# Patient Record
Sex: FEMALE | Race: BLACK OR AFRICAN AMERICAN | NOT HISPANIC OR LATINO | Employment: STUDENT | URBAN - METROPOLITAN AREA
[De-identification: names, ages, dates, MRNs, and addresses within clinical notes are randomized per-mention and may not be internally consistent; named-entity substitution may affect disease eponyms.]

---
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
## 2019-03-21 ENCOUNTER — HOSPITAL ENCOUNTER (EMERGENCY)
Facility: HOSPITAL | Age: 19
Discharge: HOME | End: 2019-03-21
Attending: EMERGENCY MEDICINE
Payer: COMMERCIAL

## 2019-03-21 VITALS
DIASTOLIC BLOOD PRESSURE: 66 MMHG | WEIGHT: 122 LBS | OXYGEN SATURATION: 99 % | TEMPERATURE: 99 F | SYSTOLIC BLOOD PRESSURE: 92 MMHG | HEIGHT: 63 IN | BODY MASS INDEX: 21.62 KG/M2 | HEART RATE: 80 BPM | RESPIRATION RATE: 16 BRPM
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
## 2019-03-21 DIAGNOSIS — S39.012A ACUTE MYOFASCIAL STRAIN OF LUMBAR REGION, INITIAL ENCOUNTER: ICD-10-CM

## 2019-03-21 DIAGNOSIS — S16.1XXA CERVICAL MYOFASCIAL STRAIN, INITIAL ENCOUNTER: ICD-10-CM

## 2019-03-21 LAB
B-HCG UR QL: NEGATIVE
CTP QC/QA: YES

## 2019-03-21 PROCEDURE — 96372 THER/PROPH/DIAG INJ SC/IM: CPT

## 2019-03-21 PROCEDURE — 81025 URINE PREGNANCY TEST: CPT | Performed by: NURSE PRACTITIONER

## 2019-03-21 PROCEDURE — 99284 EMERGENCY DEPT VISIT MOD MDM: CPT | Mod: 25

## 2019-03-21 PROCEDURE — 63600175 PHARM REV CODE 636 W HCPCS: Performed by: NURSE PRACTITIONER

## 2019-03-21 RX ORDER — KETOROLAC TROMETHAMINE 30 MG/ML
30 INJECTION, SOLUTION INTRAMUSCULAR; INTRAVENOUS
Status: COMPLETED | OUTPATIENT
Start: 2019-03-21 | End: 2019-03-21

## 2019-03-21 RX ORDER — TIZANIDINE 2 MG/1
2 TABLET ORAL EVERY 8 HOURS PRN
Qty: 10 TABLET | Refills: 0 | Status: SHIPPED | OUTPATIENT
Start: 2019-03-21

## 2019-03-21 RX ORDER — NAPROXEN 500 MG/1
500 TABLET ORAL EVERY 12 HOURS PRN
Qty: 20 TABLET | Refills: 0 | Status: SHIPPED | OUTPATIENT
Start: 2019-03-21

## 2019-03-21 RX ADMIN — KETOROLAC TROMETHAMINE 30 MG: 30 INJECTION INTRAMUSCULAR; INTRAVENOUS at 07:03
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
## 2019-03-22 NOTE — DISCHARGE INSTRUCTIONS

## 2021-07-01 ENCOUNTER — PATIENT MESSAGE (OUTPATIENT)
Dept: ADMINISTRATIVE | Facility: OTHER | Age: 21
End: 2021-07-01

## 2024-05-08 ENCOUNTER — HOSPITAL ENCOUNTER (EMERGENCY)
Facility: HOSPITAL | Age: 24
Discharge: HOME | End: 2024-05-08
Attending: EMERGENCY MEDICINE

## 2024-05-08 VITALS
HEIGHT: 63 IN | WEIGHT: 162 LBS | DIASTOLIC BLOOD PRESSURE: 77 MMHG | HEART RATE: 61 BPM | RESPIRATION RATE: 17 BRPM | TEMPERATURE: 98 F | OXYGEN SATURATION: 99 % | BODY MASS INDEX: 28.7 KG/M2 | SYSTOLIC BLOOD PRESSURE: 108 MMHG

## 2024-05-08 DIAGNOSIS — N89.8 VAGINAL DISCHARGE: Primary | ICD-10-CM

## 2024-05-08 LAB
ALBUMIN SERPL BCP-MCNC: 4.5 G/DL (ref 3.5–5.2)
ALP SERPL-CCNC: 49 U/L (ref 55–135)
ALT SERPL W/O P-5'-P-CCNC: 11 U/L (ref 10–44)
ANION GAP SERPL CALC-SCNC: 9 MMOL/L (ref 8–16)
AST SERPL-CCNC: 14 U/L (ref 10–40)
B-HCG UR QL: NEGATIVE
BASOPHILS # BLD AUTO: 0.03 K/UL (ref 0–0.2)
BASOPHILS NFR BLD: 0.4 % (ref 0–1.9)
BILIRUB SERPL-MCNC: 1.1 MG/DL (ref 0.1–1)
BILIRUB UR QL STRIP: NEGATIVE
BUN SERPL-MCNC: 8 MG/DL (ref 6–20)
CALCIUM SERPL-MCNC: 9 MG/DL (ref 8.7–10.5)
CHLORIDE SERPL-SCNC: 108 MMOL/L (ref 95–110)
CLARITY UR: CLEAR
CO2 SERPL-SCNC: 24 MMOL/L (ref 23–29)
COLOR UR: YELLOW
CREAT SERPL-MCNC: 0.8 MG/DL (ref 0.5–1.4)
CTP QC/QA: YES
DIFFERENTIAL METHOD BLD: ABNORMAL
EOSINOPHIL # BLD AUTO: 0.4 K/UL (ref 0–0.5)
EOSINOPHIL NFR BLD: 5.2 % (ref 0–8)
ERYTHROCYTE [DISTWIDTH] IN BLOOD BY AUTOMATED COUNT: 17.6 % (ref 11.5–14.5)
EST. GFR  (NO RACE VARIABLE): >60 ML/MIN/1.73 M^2
GLUCOSE SERPL-MCNC: 88 MG/DL (ref 70–110)
GLUCOSE UR QL STRIP: NEGATIVE
HCT VFR BLD AUTO: 32.2 % (ref 37–48.5)
HGB BLD-MCNC: 10 G/DL (ref 12–16)
HGB UR QL STRIP: ABNORMAL
IMM GRANULOCYTES # BLD AUTO: 0 K/UL (ref 0–0.04)
IMM GRANULOCYTES NFR BLD AUTO: 0 % (ref 0–0.5)
KETONES UR QL STRIP: NEGATIVE
LEUKOCYTE ESTERASE UR QL STRIP: ABNORMAL
LIPASE SERPL-CCNC: 27 U/L (ref 4–60)
LYMPHOCYTES # BLD AUTO: 2.3 K/UL (ref 1–4.8)
LYMPHOCYTES NFR BLD: 33.2 % (ref 18–48)
MAGNESIUM SERPL-MCNC: 1.8 MG/DL (ref 1.6–2.6)
MCH RBC QN AUTO: 23.6 PG (ref 27–31)
MCHC RBC AUTO-ENTMCNC: 31.1 G/DL (ref 32–36)
MCV RBC AUTO: 76 FL (ref 82–98)
MICROSCOPIC COMMENT: ABNORMAL
MONOCYTES # BLD AUTO: 0.5 K/UL (ref 0.3–1)
MONOCYTES NFR BLD: 7.1 % (ref 4–15)
NEUTROPHILS # BLD AUTO: 3.8 K/UL (ref 1.8–7.7)
NEUTROPHILS NFR BLD: 54.1 % (ref 38–73)
NITRITE UR QL STRIP: NEGATIVE
NRBC BLD-RTO: 0 /100 WBC
PH UR STRIP: 7 [PH] (ref 5–8)
PLATELET # BLD AUTO: 251 K/UL (ref 150–450)
PMV BLD AUTO: 9.5 FL (ref 9.2–12.9)
POTASSIUM SERPL-SCNC: 3.3 MMOL/L (ref 3.5–5.1)
PROT SERPL-MCNC: 7.3 G/DL (ref 6–8.4)
PROT UR QL STRIP: ABNORMAL
RBC # BLD AUTO: 4.24 M/UL (ref 4–5.4)
RBC #/AREA URNS HPF: 1 /HPF (ref 0–4)
SODIUM SERPL-SCNC: 141 MMOL/L (ref 136–145)
SP GR UR STRIP: 1.02 (ref 1–1.03)
SQUAMOUS #/AREA URNS HPF: 3 /HPF
URN SPEC COLLECT METH UR: ABNORMAL
UROBILINOGEN UR STRIP-ACNC: ABNORMAL EU/DL
WBC # BLD AUTO: 6.93 K/UL (ref 3.9–12.7)
WBC #/AREA URNS HPF: 7 /HPF (ref 0–5)

## 2024-05-08 PROCEDURE — 81025 URINE PREGNANCY TEST: CPT | Performed by: EMERGENCY MEDICINE

## 2024-05-08 PROCEDURE — 83690 ASSAY OF LIPASE: CPT | Performed by: EMERGENCY MEDICINE

## 2024-05-08 PROCEDURE — 25000003 PHARM REV CODE 250: Performed by: STUDENT IN AN ORGANIZED HEALTH CARE EDUCATION/TRAINING PROGRAM

## 2024-05-08 PROCEDURE — 83735 ASSAY OF MAGNESIUM: CPT | Performed by: EMERGENCY MEDICINE

## 2024-05-08 PROCEDURE — 81001 URINALYSIS AUTO W/SCOPE: CPT | Performed by: EMERGENCY MEDICINE

## 2024-05-08 PROCEDURE — 96374 THER/PROPH/DIAG INJ IV PUSH: CPT

## 2024-05-08 PROCEDURE — 63600175 PHARM REV CODE 636 W HCPCS: Performed by: STUDENT IN AN ORGANIZED HEALTH CARE EDUCATION/TRAINING PROGRAM

## 2024-05-08 PROCEDURE — 80053 COMPREHEN METABOLIC PANEL: CPT | Performed by: EMERGENCY MEDICINE

## 2024-05-08 PROCEDURE — 85025 COMPLETE CBC W/AUTO DIFF WBC: CPT | Performed by: EMERGENCY MEDICINE

## 2024-05-08 PROCEDURE — 99281 EMR DPT VST MAYX REQ PHY/QHP: CPT | Mod: 25

## 2024-05-08 RX ORDER — CEFTRIAXONE 1 G/1
1 INJECTION, POWDER, FOR SOLUTION INTRAMUSCULAR; INTRAVENOUS ONCE
Status: COMPLETED | OUTPATIENT
Start: 2024-05-08 | End: 2024-05-08

## 2024-05-08 RX ORDER — LIDOCAINE HYDROCHLORIDE 10 MG/ML
1 INJECTION, SOLUTION EPIDURAL; INFILTRATION; INTRACAUDAL; PERINEURAL
Status: DISCONTINUED | OUTPATIENT
Start: 2024-05-08 | End: 2024-05-08 | Stop reason: CLARIF

## 2024-05-08 RX ORDER — METRONIDAZOLE 500 MG/1
500 TABLET ORAL EVERY 12 HOURS
Qty: 28 TABLET | Refills: 0 | Status: SHIPPED | OUTPATIENT
Start: 2024-05-09 | End: 2024-05-23

## 2024-05-08 RX ORDER — DOXYCYCLINE 100 MG/1
100 CAPSULE ORAL 2 TIMES DAILY
Qty: 28 CAPSULE | Refills: 0 | Status: SHIPPED | OUTPATIENT
Start: 2024-05-09 | End: 2024-05-08

## 2024-05-08 RX ORDER — METRONIDAZOLE 250 MG/1
500 TABLET ORAL
Status: COMPLETED | OUTPATIENT
Start: 2024-05-08 | End: 2024-05-08

## 2024-05-08 RX ORDER — CEFTRIAXONE 1 G/1
1 INJECTION, POWDER, FOR SOLUTION INTRAMUSCULAR; INTRAVENOUS ONCE
Status: DISCONTINUED | OUTPATIENT
Start: 2024-05-08 | End: 2024-05-08

## 2024-05-08 RX ORDER — METRONIDAZOLE 500 MG/1
500 TABLET ORAL EVERY 12 HOURS
Qty: 28 TABLET | Refills: 0 | Status: SHIPPED | OUTPATIENT
Start: 2024-05-09 | End: 2024-05-08

## 2024-05-08 RX ORDER — DOXYCYCLINE 100 MG/1
100 CAPSULE ORAL ONCE
Status: COMPLETED | OUTPATIENT
Start: 2024-05-08 | End: 2024-05-08

## 2024-05-08 RX ORDER — DOXYCYCLINE 100 MG/1
100 CAPSULE ORAL 2 TIMES DAILY
Qty: 28 CAPSULE | Refills: 0 | Status: SHIPPED | OUTPATIENT
Start: 2024-05-09 | End: 2024-05-23

## 2024-05-08 RX ADMIN — DOXYCYCLINE HYCLATE 100 MG: 100 CAPSULE ORAL at 09:05

## 2024-05-08 RX ADMIN — CEFTRIAXONE SODIUM 1 G: 1 INJECTION, POWDER, FOR SOLUTION INTRAMUSCULAR; INTRAVENOUS at 09:05

## 2024-05-08 RX ADMIN — METRONIDAZOLE 500 MG: 250 TABLET ORAL at 09:05

## 2024-05-08 NOTE — ED PROVIDER NOTES
Encounter Date: 5/8/2024       History     Chief Complaint   Patient presents with    Abdominal Pain     Lower abdomen pain x1 week that radiates to the back. Pt also complains of foul discharge from her vagina.     Vaginal Discharge     HPI  23 year old female presenting for evaluation of 2 weeks constant low left pelvic pain and several days of foul-smelling vaginal discharge. + dysuria. Denies fever, chills, sore throat, decreased urination, chest pain, cough, rash. No new medications nor recent antibiotic use. 1 new partner in the past month; no barrier use. Patient does not use tampons.  Review of patient's allergies indicates:  No Known Allergies  Past Medical History:   Diagnosis Date    History of dilation and curettage      Past Surgical History:   Procedure Laterality Date    DILATION AND CURETTAGE OF UTERUS       Family History   Problem Relation Name Age of Onset    Hypertension Mother      Cancer Maternal Grandfather      Heart disease Maternal Grandfather      Diabetes Maternal Grandmother      Breast cancer Neg Hx      Colon cancer Neg Hx      Ovarian cancer Neg Hx       Social History     Tobacco Use    Smoking status: Every Day    Smokeless tobacco: Never    Tobacco comments:     3 CIGARETTES A DAY   Substance Use Topics    Alcohol use: No    Drug use: Yes     Types: Marijuana     Comment: EVERY OTHER DAY     Review of Systems   Constitutional:  Negative for chills, diaphoresis and fever.   HENT:  Negative for sore throat.    Respiratory:  Negative for shortness of breath.    Gastrointestinal:  Negative for diarrhea, nausea and vomiting.   Genitourinary:  Positive for dysuria, pelvic pain and vaginal discharge.   Skin:  Negative for rash and wound.   Neurological:  Negative for light-headedness and headaches.       Physical Exam     Initial Vitals [05/08/24 1650]   BP Pulse Resp Temp SpO2   108/77 61 17 98.1 °F (36.7 °C) 99 %      MAP       --         Physical Exam    Nursing note and vitals  reviewed.  Constitutional: She appears well-developed and well-nourished. She is not diaphoretic. She is cooperative.  Non-toxic appearance. She does not have a sickly appearance. She does not appear ill. No distress. She is not intubated.   HENT:   Head: Normocephalic and atraumatic.   Nose: Nose normal.   Eyes: Conjunctivae and EOM are normal.   Neck:   Normal range of motion.  Cardiovascular:  Normal rate and regular rhythm.           Pulmonary/Chest: Effort normal and breath sounds normal. No accessory muscle usage. No tachypnea. She is not intubated. No respiratory distress. She has no wheezes. She has no rhonchi.   Abdominal: Abdomen is soft and flat. She exhibits no distension. There is abdominal tenderness.   No right CVA tenderness.  No left CVA tenderness.     There is no rebound, no guarding and negative Morgan's sign.   Genitourinary:    Genitourinary Comments: Patient declined pelvic examination.     Musculoskeletal:         General: No tenderness or edema. Normal range of motion.      Cervical back: Normal range of motion.     Neurological: She is alert. No cranial nerve deficit.   Skin: Skin is warm and dry. No rash noted.   Psychiatric: She has a normal mood and affect. Her behavior is normal.         ED Course   Procedures  Labs Reviewed   CBC W/ AUTO DIFFERENTIAL - Abnormal; Notable for the following components:       Result Value    Hemoglobin 10.0 (*)     Hematocrit 32.2 (*)     MCV 76 (*)     MCH 23.6 (*)     MCHC 31.1 (*)     RDW 17.6 (*)     All other components within normal limits   COMPREHENSIVE METABOLIC PANEL - Abnormal; Notable for the following components:    Potassium 3.3 (*)     Total Bilirubin 1.1 (*)     Alkaline Phosphatase 49 (*)     All other components within normal limits   URINALYSIS, REFLEX TO URINE CULTURE - Abnormal; Notable for the following components:    Protein, UA Trace (*)     Urobilinogen, UA 4.0-6.0 (*)     Leukocytes, UA Trace (*)     All other components within  normal limits    Narrative:     Specimen Source->Urine   URINALYSIS MICROSCOPIC - Abnormal; Notable for the following components:    WBC, UA 7 (*)     All other components within normal limits    Narrative:     Specimen Source->Urine   LIPASE   MAGNESIUM   C. TRACHOMATIS/N. GONORRHOEAE BY AMP DNA   POCT URINE PREGNANCY          Imaging Results              US Transvaginal Non OB (Final result)  Result time 05/08/24 20:45:31   Procedure changed from US Pelvis Complete Non OB     Final result by Megha Lugo MD (05/08/24 20:45:31)                   Impression:      Unremarkable pelvic ultrasound for acute abnormalities.    Multiple ovarian follicles bilaterally and normal flow to the ovaries.    Trace fluid in the posterior cul-de-sac noted which is likely incidental/nonspecific.      Electronically signed by: Megha Lugo  Date:    05/08/2024  Time:    20:45               Narrative:    EXAMINATION:  US TRANSVAGINAL NON OB    CLINICAL HISTORY:  concern for left sided TOA;  left-sided pain    TECHNIQUE:  Endovaginal pelvic ultrasound was performed supplemented by color and pulsed Doppler.    COMPARISON:  None    FINDINGS:  Additional history is provided of a negative urine pregnancy test and left-sided pain.  Last menstrual period is unknown.    Uterus measures 8.6 x 3.7 x 4.3 cm.  Myometrium is homogeneous.  The endometrial stripe measures 3.3 mm.    The right ovary measures 3.1 x 2.1 x 2 cm with multiple dominant follicles noted measuring up to 1 cm.  Normal flow noted to the right ovary.    The left ovary measures 3.4 x 2.3 x 2.2 cm and multiple follicles are noted with the largest measuring 9 mm.  Normal vascular flow is seen.    There is a trace amount of free fluid in the cul-de-sac.                                       Medications   doxycycline capsule 100 mg (100 mg Oral Given 5/8/24 2141)   metroNIDAZOLE tablet 500 mg (500 mg Oral Given 5/8/24 2141)   cefTRIAXone injection 1 g (1 g Intravenous  Given 5/8/24 2143)     Medical Decision Making  Amount and/or Complexity of Data Reviewed  Labs: ordered.     Details: See MDM  Radiology: ordered.     Details: See MDM    Risk  OTC drugs.  Prescription drug management.  Diagnosis or treatment significantly limited by social determinants of health.  Risk Details: Uninsured patient.    PGY-4 MDM:  Briefly, Mitch Paul is a 23 year old female presenting for evaluation of 2 weeks constant low left pelvic pain and several days of foul-smelling vaginal discharge. + dysuria. Denies fever, chills, sore throat, decreased urination, chest pain, cough, rash. No new medications nor recent antibiotic use. 1 new partner in the past month; no barrier use.    On arrival patient vitals were   Initial Vitals [05/08/24 1650]   BP Pulse Resp Temp SpO2   108/77 61 17 98.1 °F (36.7 °C) 99 %      MAP       --          On examination, patient Overall well-appearing,, Normal volume heart sounds, normal lung sounds, Extremities without swelling or calf tenderness, abdominal exam notable for low left pelvic tenderness to deep palpation without guarding/rebound> Patient declined pelvic examination.    Labs ordered & notable for:   CBC: microcytic anemia hgb 10, suspect menorrhagia and Fe-deficiency  CMP: bilirubin 1.1 (near previous values), K 3.3  Lipase: 27  UA: trace protein, trace LE. Micro 7 WBC, 1 RBC.    TVUS obtained to evaluate for possible ovarian pathology vs TOA. TVUS showed no acute abnormalities.    Concern for BV but patient declined pelvic examination. In lieu of this we discussed prophylactic treatment for gonorrhea/chlamydia with ceftriaxone/doxy and BV with metronidazole, she would like to be treated at this time and then return if symptoms do not improve. We discussed the risks of this including worsening improperly treated infection or other  pathology that could result in worsening infection, infertility, or even death. Patient signed AMA form. Given 1g  ceftriaxone w/ lidocaine IM, doxycycline 100 mg PO, and metronidazole 500 mg PO while in ED to start PID treatment. Rx sent to patient's preferred pharmacy for doxy/metronidazole. Discussed results with patient, patient's questions were answered. Patient expressed understanding health plan, return precautions, and follow up plan. Patient stable for discharge at this time.    Donnie Lemons  [facility] PGY-4  9:09 PM 05/09/2024              Attending Attestation:   Physician Attestation Statement for Resident:  As the supervising MD   Physician Attestation Statement: I have personally seen and examined this patient.   I agree with the above history.  -:   As the supervising MD I agree with the above PE.     As the supervising MD I agree with the above treatment, course, plan, and disposition.   -: Have explained the need to obtain a pelvic exam.  Patient is declining.  Risk of refusal have been explained in detail including severe infection, permanent disability, infertility, chronic pain and death.  Patient voices understanding.  Insist on her decision.  Have urged the patient to follow up closely with her primary care provider as well as gynecology.  STD precautions discussed.   I have reviewed and agree with the residents interpretation of the following: lab data, x-rays, CT scans and EKG.                                        Clinical Impression:  Final diagnoses:  [N89.8] Vaginal discharge (Primary)          ED Disposition Condition    AMA Stable                Donnie Lemons MD  Resident  05/08/24 2111       Donnie Lemons MD  Resident  05/08/24 2132       Tiny Parnell MD  05/09/24 2017

## 2024-05-08 NOTE — Clinical Note
"Date: 5/8/2024  Patient: Mitch Paul  Admitted: 5/8/2024  4:48 PM  Attending Provider: Tiny Parnell MD    Mitch Paul or her authorized caregiver has made the decision for the patient to leave the emergency department against the ad vice of resident physician. She or her authorized caregiver has been informed and understands the inherent risks, including death, worsening of possible  infection.  She or her authorized caregiver has decided to accept the responsibility for this  decision. Mitch Paul and all necessary parties have been advised that she may return for further evaluation or treatment. Her condition at time of discharge was stable.  Mitch Paul had current vital signs as follows:  /77 (BP Lo cation: Left arm, Patient Position: Sitting)   Pulse 61   Temp 98.1 °F (36.7 °C) (Oral)   Resp 17   Ht 5' 3" (1.6 m)   Wt 73.5 kg (162 lb)   "

## 2024-05-09 NOTE — ED NOTES
Bed: [place] [address]  Expected date:   Expected time:   Means of arrival:   Comments:  urszula

## 2024-05-09 NOTE — DISCHARGE INSTRUCTIONS
Do not have sexual intercourse from now until 5 days after your last dose of antibiotics.  Discharge instructions for a patient seen in the emergency department for pelvic inflammatory disease (PID):  Medication Information:  Take all prescribed medications as directed by your healthcare provider.  Finish the full course of antibiotics even if you start feeling better.  If you experience any side effects or allergic reactions, contact your healthcare provider immediately.  Follow-up Care:  Schedule a follow-up appointment with your gynecologist or primary care provider within the recommended time frame.  It's important to ensure that the infection has cleared and to discuss any ongoing symptoms or concerns.  Rest and Recovery:  Get plenty of rest to aid in your body's healing process.  Avoid strenuous activities and sexual intercourse until your healthcare provider gives you clearance.  Pain Management:  Take over-the-counter pain relievers such as ibuprofen or acetaminophen as needed for pain relief.  Apply a heating pad to your lower abdomen for comfort, if recommended by your healthcare provider.  Sexual Partners:  Inform your sexual partners about your diagnosis so they can seek medical evaluation and treatment if necessary.  Abstain from sexual activity until both you and your partner(s) have completed treatment and received clearance from a healthcare provider.  Preventive Measures:  Use barrier methods of contraception, such as condoms, to prevent the spread of sexually transmitted infections (STIs).  Attend routine gynecological screenings and STI testing as recommended by your healthcare provider.  Warning Signs:  Be aware of symptoms that may indicate a worsening infection, such as severe pelvic pain, high fever, vomiting, or persistent symptoms despite treatment.  Seek medical attention immediately if you experience any concerning symptoms.  Hydration and Nutrition:  Drink plenty of fluids to stay  hydrated and aid in recovery.  Eat a balanced diet rich in fruits, vegetables, and whole grains to support your immune system.  Education and Resources:  Learn more about PID, its causes, and preventive measures through reputable sources or educational materials provided by your healthcare provider.  Take advantage of support groups or counseling services if you need emotional support or have concerns about your diagnosis.  Emergency Contact Information:  Keep emergency contact numbers, including your healthcare provider's office and local emergency services, readily available in case of worsening symptoms or emergencies.  Remember to follow your healthcare provider's instructions closely and reach out if you have any questions or concerns during your recovery. Your health and well-being are important, and there are resources available to support you.

## 2024-07-31 ENCOUNTER — HOSPITAL ENCOUNTER (EMERGENCY)
Facility: HOSPITAL | Age: 24
Discharge: HOME | End: 2024-07-31
Attending: EMERGENCY MEDICINE
Payer: MEDICAID

## 2024-07-31 VITALS
OXYGEN SATURATION: 99 % | TEMPERATURE: 98 F | SYSTOLIC BLOOD PRESSURE: 130 MMHG | BODY MASS INDEX: 24.8 KG/M2 | WEIGHT: 140 LBS | RESPIRATION RATE: 18 BRPM | HEART RATE: 78 BPM | DIASTOLIC BLOOD PRESSURE: 70 MMHG | HEIGHT: 63 IN

## 2024-07-31 DIAGNOSIS — B00.9 HERPES: Primary | ICD-10-CM

## 2024-07-31 DIAGNOSIS — A53.9 SYPHILIS: ICD-10-CM

## 2024-07-31 LAB
B-HCG UR QL: NEGATIVE
CTP QC/QA: YES

## 2024-07-31 PROCEDURE — 63600175 PHARM REV CODE 636 W HCPCS: Mod: JZ,JG | Performed by: NURSE PRACTITIONER

## 2024-07-31 PROCEDURE — 99284 EMERGENCY DEPT VISIT MOD MDM: CPT | Mod: 25

## 2024-07-31 PROCEDURE — 96372 THER/PROPH/DIAG INJ SC/IM: CPT | Performed by: NURSE PRACTITIONER

## 2024-07-31 PROCEDURE — 81025 URINE PREGNANCY TEST: CPT | Performed by: NURSE PRACTITIONER

## 2024-07-31 RX ORDER — VALACYCLOVIR HYDROCHLORIDE 1 G/1
1000 TABLET, FILM COATED ORAL 3 TIMES DAILY
Qty: 21 TABLET | Refills: 0 | Status: SHIPPED | OUTPATIENT
Start: 2024-07-31 | End: 2024-08-07

## 2024-07-31 RX ADMIN — PENICILLIN G BENZATHINE 2.4 MILLION UNITS: 1200000 INJECTION, SUSPENSION INTRAMUSCULAR at 11:07

## 2024-07-31 NOTE — DISCHARGE INSTRUCTIONS
You have been treated for syphilis here in the ED take your medication 3 times a day for the next 7 days.  This has for herpes.  I have placed a ambulatory referral to [facility].  Someone should be calling you in the next 48 hours if you have not heard from them by Friday at 3:00 a.m. please give them a call at the number listed here. [phone].  Return to the ED for any worsening of symptoms or any other concerns.

## 2024-07-31 NOTE — FIRST PROVIDER EVALUATION
Emergency Department TeleTriage Encounter Note      CHIEF COMPLAINT    Chief Complaint   Patient presents with    Exposure to STD     Pt tested positive for syphilis and herpes and was sent here because clinic didn't have meds for treatment        VITAL SIGNS   Initial Vitals [07/31/24 1000]   BP Pulse Resp Temp SpO2   104/67 71 18 98.3 °F (36.8 °C) 100 %      MAP       --            ALLERGIES    Review of patient's allergies indicates:  No Known Allergies    PROVIDER TRIAGE NOTE  Verbal consent for the teletriage evaluation was given by the patient at the start of the evaluation.  All efforts will be made to maintain patient's privacy during the evaluation.      This is a teletriage evaluation of a 24 y.o. female presenting to the ED with c/o recently tested positive for HSV and syphilis with UC; was instructed to come to the ED for treatment. Denies symptoms or rash.  No new lab results noted in Epic.  Negative HSV and Syphilis in 1/2024.  Limited physical exam via telehealth: The patient is awake, alert, answering questions appropriately and is not in respiratory distress.  As the Teletriage provider, I performed an initial assessment and ordered appropriate labs and imaging studies, if any, to facilitate the patient's care once placed in the ED. Once a room is available, care and a full evaluation will be completed by an alternate ED provider.  Any additional orders and the final disposition will be determined by that provider.  All imaging and labs will not be followed-up by the Teletriage Team, including myself.      Will await in-person eval for treatment and or repeat testing    ORDERS  Labs Reviewed   POCT URINE PREGNANCY       ED Orders (720h ago, onward)      Start Ordered     Status Ordering Provider    07/31/24 1017 07/31/24 1016  POCT urine pregnancy  Once         Ordered ALBERT BELLE A              Virtual Visit Note: The provider triage portion of this emergency department evaluation and  documentation was performed via [employer], a HIPAA-compliant telemedicine application, in concert with a tele-presenter in the room. A face to face patient evaluation with one of my colleagues will occur once the patient is placed in an emergency department room.      DISCLAIMER: This note was prepared with [employer] voice recognition transcription software. Garbled syntax, mangled pronouns, and other bizarre constructions may be attributed to that software system.

## 2024-07-31 NOTE — ED PROVIDER NOTES
"Encounter Date: 7/31/2024       History     Chief Complaint   Patient presents with    Exposure to STD     Pt tested positive for syphilis and herpes and was sent here because clinic didn't have meds for treatment      Presents for treatment of herpes genitalia as well as syphilis.  Patient was seen at an urgent care and had multiple testing.  Her HIV testing was negative.  All other tests were negative with the exception of herpes and syphilis.  They did not have the medication for syphilis.  They also did not treat her for her genital herpes.  She has the reports with her.  I have scanned them into her chart.  Patient denies any symptoms of herpes genitalia as well as syphilis.  Patient has no sores on her palms of her hand the soles of her feet.  She does report that the person she thinks gave this to her did have "messed up looking feet"      Review of patient's allergies indicates:  No Known Allergies  Past Medical History:   Diagnosis Date    History of dilation and curettage      Past Surgical History:   Procedure Laterality Date    DILATION AND CURETTAGE OF UTERUS       Family History   Problem Relation Name Age of Onset    Hypertension Mother      Cancer Maternal Grandfather      Heart disease Maternal Grandfather      Diabetes Maternal Grandmother      Breast cancer Neg Hx      Colon cancer Neg Hx      Ovarian cancer Neg Hx       Social History     Tobacco Use    Smoking status: Every Day    Smokeless tobacco: Never    Tobacco comments:     3 CIGARETTES A DAY   Substance Use Topics    Alcohol use: No    Drug use: Yes     Types: Marijuana     Comment: EVERY OTHER DAY     Review of Systems   Constitutional:  Negative for fever.   Respiratory:  Negative for cough, shortness of breath and wheezing.    Cardiovascular:  Negative for chest pain, palpitations and leg swelling.   Gastrointestinal:  Negative for abdominal pain, diarrhea, nausea and vomiting.   Genitourinary:  Negative for difficulty urinating, " dysuria, frequency, genital sores, pelvic pain, vaginal bleeding, vaginal discharge and vaginal pain.   Musculoskeletal:  Negative for gait problem.   Skin:  Negative for rash.   Neurological:  Negative for weakness.       Physical Exam     Initial Vitals [07/31/24 1000]   BP Pulse Resp Temp SpO2   104/67 71 18 98.3 °F (36.8 °C) 100 %      MAP       --         Physical Exam    Constitutional: She appears well-developed and well-nourished.   HENT:   Head: Normocephalic and atraumatic.   Eyes: Conjunctivae are normal.   Neck:   Normal range of motion.  Cardiovascular:  Normal rate and regular rhythm.           Pulmonary/Chest: Breath sounds normal. No respiratory distress.   Abdominal: Abdomen is soft. Bowel sounds are normal. She exhibits no distension. There is no abdominal tenderness.   Genitourinary:    No vaginal discharge.      Genitourinary Comments: There are no lesions noted to the labia are near the perianal area including the rectum..  Her pelvic exam was also benign.  There are no cankers noted.     Musculoskeletal:         General: Normal range of motion.      Cervical back: Normal range of motion.     Neurological: She is alert and oriented to person, place, and time. No sensory deficit. GCS score is 15. GCS eye subscore is 4. GCS verbal subscore is 5. GCS motor subscore is 6.   Skin: Skin is warm and dry. Capillary refill takes less than 2 seconds.   Psychiatric: She has a normal mood and affect. Thought content normal.         ED Course   Procedures  Labs Reviewed   POCT URINE PREGNANCY       Result Value    POC Preg Test, Ur Negative       Acceptable Yes            Imaging Results    None          Medications   penicillin G benzathine (BICILLIN LA) injection 2.4 Million Units (2.4 Million Units Intramuscular Given 7/31/24 1153)     Medical Decision Making  Presents for treatment syphilis and herpes genitalia.  She was seen at a urgent care.  She had multiple STD testing.  The HIV was  negative all other tests were negative with the exception of herpes and syphilis.  They were positive.  She was not even given a prescription for any antiviral for the herpes she was instructed to come here for treatment.    Amount and/or Complexity of Data Reviewed  Discussion of management or test interpretation with external provider(s): Patient was given Bicillin LA 2.4 million units here in the ED. she was also given a prescription for Valtrex 1000 mg t.i.d. for 7 days.  I have placed an ambulatory referral to [facility] for follow up.  At no time while in the ED this patient appear to be in any acute distress.  I have spent a lot of time discussing safe sex practices.  She is also to alert her sex partners.  She states her last sex partner is in longterm in [facility].  She was given return precautions.    Risk  Prescription drug management.                                      Clinical Impression:  Final diagnoses:  [A53.9] Syphilis  [B00.9] Herpes (Primary)          ED Disposition Condition    Discharge Stable          ED Prescriptions       Medication Sig Dispense Start Date End Date Auth. Provider    valACYclovir (VALTREX) 1000 MG tablet Take 1 tablet (1,000 mg total) by mouth 3 (three) times daily. for 7 days 21 tablet 7/31/2024 8/7/2024 Sanjuanita Franco NP          Follow-up Information       Follow up With Specialties Details Why Contact Info    [facility]  In 3 days  [address] [place] [address]  [phone]               Sanjuanita Franco NP  07/31/24 4268

## 2025-02-09 ENCOUNTER — HOSPITAL ENCOUNTER (EMERGENCY)
Facility: HOSPITAL | Age: 25
Discharge: HOME | End: 2025-02-09
Attending: STUDENT IN AN ORGANIZED HEALTH CARE EDUCATION/TRAINING PROGRAM
Payer: MEDICAID

## 2025-02-09 VITALS
HEIGHT: 63 IN | OXYGEN SATURATION: 100 % | WEIGHT: 145 LBS | TEMPERATURE: 99 F | SYSTOLIC BLOOD PRESSURE: 108 MMHG | HEART RATE: 78 BPM | RESPIRATION RATE: 18 BRPM | DIASTOLIC BLOOD PRESSURE: 72 MMHG | BODY MASS INDEX: 25.69 KG/M2

## 2025-02-09 DIAGNOSIS — N30.01 ACUTE CYSTITIS WITH HEMATURIA: ICD-10-CM

## 2025-02-09 DIAGNOSIS — N76.0 BACTERIAL VAGINITIS: ICD-10-CM

## 2025-02-09 DIAGNOSIS — R30.0 DYSURIA: Primary | ICD-10-CM

## 2025-02-09 DIAGNOSIS — B96.89 BACTERIAL VAGINITIS: ICD-10-CM

## 2025-02-09 LAB
B-HCG UR QL: NEGATIVE
BACTERIA #/AREA URNS HPF: ABNORMAL /HPF
BILIRUB UR QL STRIP: NEGATIVE
CLARITY UR: ABNORMAL
CLUE CELLS VAG QL WET PREP: ABNORMAL
COLOR UR: YELLOW
CTP QC/QA: YES
GLUCOSE UR QL STRIP: NEGATIVE
HGB UR QL STRIP: ABNORMAL
KETONES UR QL STRIP: NEGATIVE
LEUKOCYTE ESTERASE UR QL STRIP: ABNORMAL
MICROSCOPIC COMMENT: ABNORMAL
NITRITE UR QL STRIP: NEGATIVE
NON-SQ EPI CELLS #/AREA URNS HPF: 2 /HPF
PH UR STRIP: 7 [PH] (ref 5–8)
PROT UR QL STRIP: ABNORMAL
RBC #/AREA URNS HPF: 36 /HPF (ref 0–4)
SP GR UR STRIP: 1.01 (ref 1–1.03)
SPECIMEN SOURCE: ABNORMAL
SQUAMOUS #/AREA URNS HPF: 30 /HPF
T VAGINALIS GENITAL QL WET PREP: ABNORMAL
URN SPEC COLLECT METH UR: ABNORMAL
UROBILINOGEN UR STRIP-ACNC: NEGATIVE EU/DL
WBC #/AREA URNS HPF: >100 /HPF (ref 0–5)
WBC CLUMPS URNS QL MICRO: ABNORMAL
YEAST GENITAL QL WET PREP: ABNORMAL

## 2025-02-09 PROCEDURE — 87081 CULTURE SCREEN ONLY: CPT | Performed by: STUDENT IN AN ORGANIZED HEALTH CARE EDUCATION/TRAINING PROGRAM

## 2025-02-09 PROCEDURE — 81001 URINALYSIS AUTO W/SCOPE: CPT | Performed by: STUDENT IN AN ORGANIZED HEALTH CARE EDUCATION/TRAINING PROGRAM

## 2025-02-09 PROCEDURE — 87591 N.GONORRHOEAE DNA AMP PROB: CPT | Performed by: STUDENT IN AN ORGANIZED HEALTH CARE EDUCATION/TRAINING PROGRAM

## 2025-02-09 PROCEDURE — 87086 URINE CULTURE/COLONY COUNT: CPT | Performed by: STUDENT IN AN ORGANIZED HEALTH CARE EDUCATION/TRAINING PROGRAM

## 2025-02-09 PROCEDURE — 87186 SC STD MICRODIL/AGAR DIL: CPT | Performed by: STUDENT IN AN ORGANIZED HEALTH CARE EDUCATION/TRAINING PROGRAM

## 2025-02-09 PROCEDURE — 81025 URINE PREGNANCY TEST: CPT | Performed by: STUDENT IN AN ORGANIZED HEALTH CARE EDUCATION/TRAINING PROGRAM

## 2025-02-09 PROCEDURE — 99284 EMERGENCY DEPT VISIT MOD MDM: CPT

## 2025-02-09 PROCEDURE — 87205 SMEAR GRAM STAIN: CPT | Performed by: STUDENT IN AN ORGANIZED HEALTH CARE EDUCATION/TRAINING PROGRAM

## 2025-02-09 PROCEDURE — 87210 SMEAR WET MOUNT SALINE/INK: CPT | Performed by: STUDENT IN AN ORGANIZED HEALTH CARE EDUCATION/TRAINING PROGRAM

## 2025-02-09 RX ORDER — NITROFURANTOIN 25; 75 MG/1; MG/1
100 CAPSULE ORAL 2 TIMES DAILY
Qty: 10 CAPSULE | Refills: 0 | Status: SHIPPED | OUTPATIENT
Start: 2025-02-09 | End: 2025-02-09

## 2025-02-09 RX ORDER — METRONIDAZOLE 7.5 MG/G
1 GEL VAGINAL NIGHTLY
Qty: 70 G | Refills: 0 | Status: SHIPPED | OUTPATIENT
Start: 2025-02-09 | End: 2025-02-09

## 2025-02-09 RX ORDER — NITROFURANTOIN 25; 75 MG/1; MG/1
100 CAPSULE ORAL 2 TIMES DAILY
Qty: 10 CAPSULE | Refills: 0 | Status: SHIPPED | OUTPATIENT
Start: 2025-02-09 | End: 2025-02-14

## 2025-02-09 RX ORDER — METRONIDAZOLE 7.5 MG/G
1 GEL VAGINAL NIGHTLY
Qty: 70 G | Refills: 0 | Status: SHIPPED | OUTPATIENT
Start: 2025-02-09 | End: 2025-02-14

## 2025-02-09 NOTE — DISCHARGE INSTRUCTIONS
You were seen for UTI.  Take your Macrobid twice a day for 5 days.  Use your Metrogel once a night for 5 days.  Follow up on the additional tests we discussed.    Please return to this facility or another ED as needed for any new or worsening symptoms including chest pain, shortness of breath, abdominal pain, nausea or vomiting, fever or chills. Please follow up with your primary care doctor in 3 days. Please take all medicines as prescribed.

## 2025-02-09 NOTE — ED PROVIDER NOTES
Encounter Date: 2/9/2025       History     Chief Complaint   Patient presents with    Dysuria    Bladder Pain     Patient c/o painful urination and bladder pain x 1 week     HPI  24 that has been treated presents for evaluation of dysuria for a proximally 1 week it started after she had intercourse.  She denies fever chills nausea or vomiting.  She reports some suprapubic cramping otherwise has not no belly complaints.  Denies vaginal bleeding or overt discharge.  No medicines regularly no allergies to anything.  She had syphilis but it was treated.  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  Review of patient's allergies indicates:  No Known Allergies  Past Medical History:   Diagnosis Date    History of dilation and curettage      Past Surgical History:   Procedure Laterality Date    DILATION AND CURETTAGE OF UTERUS       Family History   Problem Relation Name Age of Onset    Hypertension Mother      Cancer Maternal Grandfather      Heart disease Maternal Grandfather      Diabetes Maternal Grandmother      Breast cancer Neg Hx      Colon cancer Neg Hx      Ovarian cancer Neg Hx       Social History     Tobacco Use    Smoking status: Every Day    Smokeless tobacco: Never    Tobacco comments:     3 CIGARETTES A DAY   Substance Use Topics    Alcohol use: No    Drug use: Yes     Types: Marijuana     Comment: EVERY OTHER DAY     Review of Systems   All other systems reviewed and are negative.      Physical Exam     Initial Vitals [02/09/25 0348]   BP Pulse Resp Temp SpO2   102/69 76 18 99.2 °F (37.3 °C) 97 %      MAP       --         Physical Exam    Nursing note and vitals reviewed.  Constitutional: She appears well-developed. She is not diaphoretic.   HENT:   Head: Normocephalic and atraumatic.   Eyes: Right eye exhibits no discharge. Left eye exhibits no discharge.   Neck: No tracheal deviation present.   Cardiovascular:  Normal rate, regular rhythm and intact distal pulses.            Pulmonary/Chest: Breath sounds normal. No stridor. No respiratory distress.   Abdominal: Abdomen is soft. There is no abdominal tenderness. There is no rebound and no guarding.   Musculoskeletal:         General: No tenderness.     Neurological: She is alert and oriented to person, place, and time.   Skin: Skin is warm and dry.         ED Course   Procedures  Labs Reviewed   VAGINAL SCREEN - Abnormal       Result Value    Trichomonas None      Clue Cells Few (*)     Budding Yeast None      Wet Prep Source Vagina     URINALYSIS, REFLEX TO URINE CULTURE - Abnormal    Specimen UA Urine, Clean Catch      Color, UA Yellow      Appearance, UA Hazy (*)     pH, UA 7.0      Specific Gravity, UA 1.015      Protein, UA Trace (*)     Glucose, UA Negative      Ketones, UA Negative      Bilirubin (UA) Negative      Occult Blood UA 2+ (*)     Nitrite, UA Negative      Urobilinogen, UA Negative      Leukocytes, UA 3+ (*)     Narrative:     Specimen Source->Urine   URINALYSIS MICROSCOPIC - Abnormal    RBC, UA 36 (*)     WBC, UA >100 (*)     WBC Clumps, UA Few (*)     Bacteria Occasional      Squam Epithel, UA 30      Non-Squam Epith 2 (*)     Microscopic Comment SEE COMMENT      Narrative:     Specimen Source->Urine   CULTURE, GONOCOCCUS   CULTURE, URINE   HEPATITIS C ANTIBODY   HIV 1 / 2 ANTIBODY   C. TRACHOMATIS/N. GONORRHOEAE BY AMP DNA   POCT URINE PREGNANCY    POC Preg Test, Ur Negative       Acceptable Yes            Imaging Results    None          Medications - No data to display  Medical Decision Making  24-year-old woman presents for urinary tract infection symptoms, vitals are within normal limits, her abdomen is soft and nontender on exam.  Differential includes UTI vaginitis STI.  We had a shared decision-making discussion regarding pelvic exam I offered to perform a pelvic exam and guaiac swabs, she would prefer to self swab I think this is reasonable.  I do not think she needs labs or advanced imaging  based on history and physical.    Looks like she has BV and a UTI, I will treat her for both as an outpatient.  Repeat exam is reassuring, updated patient on findings and plan, she will follow up on her STI testing Return precautions/follow up instructions/treatment plan given, expressed agreement and understanding.       Amount and/or Complexity of Data Reviewed  External Data Reviewed: notes.  Labs: ordered. Decision-making details documented in ED Course.    Risk  Prescription drug management.               ED Course as of 02/09/25 0525   Sun Feb 09, 2025   0446 hCG Qualitative, Urine: Negative [IC]      ED Course User Index  [IC] Juaquin Gaviria MD                           Clinical Impression:  Final diagnoses:  [R30.0] Dysuria (Primary)  [N76.0, B96.89] Bacterial vaginitis  [N30.01] Acute cystitis with hematuria          ED Disposition Condition    Discharge Stable          ED Prescriptions       Medication Sig Dispense Start Date End Date Auth. Provider    metroNIDAZOLE (METROGEL) 0.75 % (37.5mg/5 gram) vaginal gel Place 1 applicator vaginally every evening. for 5 days 70 g 2/9/2025 2/14/2025 Juaquin Gaviria MD    nitrofurantoin, macrocrystal-monohydrate, (MACROBID) 100 MG capsule Take 1 capsule (100 mg total) by mouth 2 (two) times daily. for 5 days 10 capsule 2/9/2025 2/14/2025 Juaquin Gaviria MD          Follow-up Information       Follow up With Specialties Details Why Contact Info Additional [facility], [address]  Schedule an appointment as soon as possible for a visit in 3 days  [address]  [facility] [address]  [phone]       [facility] - Emergency Dept Emergency Medicine Go to  As needed, If symptoms worsen [address] [facility] [id number] [phone] 1st floor             Juaquin Gaviria MD  02/09/25 3128

## 2025-02-11 LAB
BACTERIA UR CULT: ABNORMAL
CHLAMYDIA, AMPLIFIED DNA: NEGATIVE
N GONORRHOEAE, AMPLIFIED DNA: NEGATIVE

## 2025-02-12 LAB
BACTERIA GENITAL AEROBE CULT: NORMAL
GRAM STN SPEC: NORMAL